# Patient Record
Sex: FEMALE | Race: OTHER | Employment: FULL TIME | URBAN - METROPOLITAN AREA
[De-identification: names, ages, dates, MRNs, and addresses within clinical notes are randomized per-mention and may not be internally consistent; named-entity substitution may affect disease eponyms.]

---

## 2022-03-03 ENCOUNTER — APPOINTMENT (OUTPATIENT)
Dept: CT IMAGING | Age: 42
End: 2022-03-03
Payer: COMMERCIAL

## 2022-03-03 ENCOUNTER — HOSPITAL ENCOUNTER (EMERGENCY)
Age: 42
Discharge: HOME | End: 2022-03-03
Attending: EMERGENCY MEDICINE
Payer: COMMERCIAL

## 2022-03-03 VITALS
HEART RATE: 62 BPM | OXYGEN SATURATION: 100 % | TEMPERATURE: 97.8 F | SYSTOLIC BLOOD PRESSURE: 106 MMHG | BODY MASS INDEX: 29.59 KG/M2 | HEIGHT: 63 IN | WEIGHT: 167 LBS | RESPIRATION RATE: 17 BRPM | DIASTOLIC BLOOD PRESSURE: 66 MMHG

## 2022-03-03 DIAGNOSIS — G43.109 MIGRAINE WITH AURA AND WITHOUT STATUS MIGRAINOSUS, NOT INTRACTABLE: Primary | ICD-10-CM

## 2022-03-03 PROCEDURE — 6360000002 HC RX W HCPCS: Performed by: PHYSICIAN ASSISTANT

## 2022-03-03 PROCEDURE — 96375 TX/PRO/DX INJ NEW DRUG ADDON: CPT

## 2022-03-03 PROCEDURE — 99283 EMERGENCY DEPT VISIT LOW MDM: CPT

## 2022-03-03 PROCEDURE — 70450 CT HEAD/BRAIN W/O DYE: CPT

## 2022-03-03 PROCEDURE — 96374 THER/PROPH/DIAG INJ IV PUSH: CPT

## 2022-03-03 PROCEDURE — 2580000003 HC RX 258: Performed by: PHYSICIAN ASSISTANT

## 2022-03-03 RX ORDER — KETOROLAC TROMETHAMINE 30 MG/ML
30 INJECTION, SOLUTION INTRAMUSCULAR; INTRAVENOUS ONCE
Status: COMPLETED | OUTPATIENT
Start: 2022-03-03 | End: 2022-03-03

## 2022-03-03 RX ORDER — ALBUTEROL SULFATE 2.5 MG/3ML
2.5 SOLUTION RESPIRATORY (INHALATION) EVERY 6 HOURS PRN
COMMUNITY

## 2022-03-03 RX ORDER — PROCHLORPERAZINE EDISYLATE 5 MG/ML
10 INJECTION INTRAMUSCULAR; INTRAVENOUS ONCE
Status: COMPLETED | OUTPATIENT
Start: 2022-03-03 | End: 2022-03-03

## 2022-03-03 RX ORDER — 0.9 % SODIUM CHLORIDE 0.9 %
1000 INTRAVENOUS SOLUTION INTRAVENOUS ONCE
Status: COMPLETED | OUTPATIENT
Start: 2022-03-03 | End: 2022-03-03

## 2022-03-03 RX ORDER — DIPHENHYDRAMINE HYDROCHLORIDE 50 MG/ML
25 INJECTION INTRAMUSCULAR; INTRAVENOUS ONCE
Status: COMPLETED | OUTPATIENT
Start: 2022-03-03 | End: 2022-03-03

## 2022-03-03 RX ADMIN — KETOROLAC TROMETHAMINE 30 MG: 30 INJECTION, SOLUTION INTRAMUSCULAR at 12:54

## 2022-03-03 RX ADMIN — PROCHLORPERAZINE EDISYLATE 10 MG: 5 INJECTION INTRAMUSCULAR; INTRAVENOUS at 12:18

## 2022-03-03 RX ADMIN — DIPHENHYDRAMINE HYDROCHLORIDE 25 MG: 50 INJECTION, SOLUTION INTRAMUSCULAR; INTRAVENOUS at 12:15

## 2022-03-03 RX ADMIN — SODIUM CHLORIDE 1000 ML: 9 INJECTION, SOLUTION INTRAVENOUS at 12:20

## 2022-03-03 ASSESSMENT — PAIN SCALES - GENERAL
PAINLEVEL_OUTOF10: 7
PAINLEVEL_OUTOF10: 3

## 2022-03-03 NOTE — ED PROVIDER NOTES
[address]  eMERGENCY dEPARTMENT eNCOUnter   Independent Attestation     Pt Name: Teddy Vargas  MRN: [id number]  [place] 1980  Date of evaluation: 3/3/22       Teddy Vargas is a 37 y.o. female who presents with Migraine        Based on the medical record, the care appears appropriate. I was personally available for consultation in the Emergency Department.     Geovanna Carcamo MD  Attending Emergency  Physician                 Geovanna Carcamo MD  03/03/22 3552

## 2022-03-03 NOTE — ED NOTES
Mode of arrival (squad #, walk in, police, etc) : Walked into triage        Chief complaint(s): Migraine Headache        Arrival Note (brief scenario, treatment PTA, etc). : Complaining of migraine h/a which started yesterday. Complaining of nausea. Hx Migraines. A/O X 3        C= \"Have you ever felt that you should Cut down on your drinking? \"  No  A= \"Have people Annoyed you by criticizing your drinking? \"  No  G= \"Have you ever felt bad or Guilty about your drinking? \"  No  E= \"Have you ever had a drink as an Eye-opener first thing in the morning to steady your nerves or to help a hangover? \"  No      Deferred []      Reason for deferring: N/A    *If yes to two or more: probable alcohol abuse. Aaliyah Schroeder RN  03/03/22 1329

## 2022-03-03 NOTE — ED PROVIDER NOTES
[address]  eMERGENCY dEPARTMENT eNCOUnter      Pt Name: Jennifer Watson  MRN: [id number]  [place] 1980  Date of evaluation: 3/3/22      CHIEF COMPLAINT:   Chief Complaint   Patient presents with    Migraine     HISTORY OF PRESENT ILLNESS    Jennifer Watson is a 37 y.o. female who presents with headache that started yesterday. Pt reports the headache has worsened since onset and was not \"thunderclap\" in origin. States this headache is different from typical migraines; therefore, worst headache of life. There has been no syncope associated either. Pt describes the pain as throbbing over forehead with radiation to back of head. She admits to photophobia and nausea. Denies syncope, fever, chills, neck stiffness, URI symptoms, cp, sob, abd pain, numbness, weakness. She did have one episode of emesis yesterday. Has tried tylenol and ASA. Reports hx of migraines. States she has not had one in 2.5 years. Pt did recently move here from [place]. No other complaints. REVIEW OF SYSTEMS     Headache  Photophobia  Nausea  Emesis    Negative in 10 essential Systems except as mentioned above and in the HPI. PAST MEDICAL HISTORY   PMH:  has a past medical history of Asthma and Headache. none otherwise stated from nurses notes  Surgical History:  has a past surgical history that includes Abdomen surgery. none otherwise stated from nurses notes  Social History:  reports that she has never smoked. She has never used smokeless tobacco. She reports that she does not drink alcohol and does not use drugs. , lives at home with others  Family History: none  Psychiatric History: none    Allergies:is allergic to pcn [penicillins]. PHYSICAL EXAM     INITIAL VITALS: /66   Pulse 62   Temp 97.8 °F (36.6 °C) (Oral)   Resp 17   Ht 5' 3\" (1.6 m)   Wt 167 lb (75.8 kg)   LMP 02/09/2022   SpO2 100%   BMI 29.58 kg/m²   Physical Exam  Vitals and nursing note reviewed.    Constitutional:       General: She is awake. Appearance: Normal appearance. She is well-developed, well-groomed and normal weight. HENT:      Head: Normocephalic and atraumatic. Eyes:      Extraocular Movements: Extraocular movements intact. Conjunctiva/sclera: Conjunctivae normal.      Pupils: Pupils are equal, round, and reactive to light. Cardiovascular:      Rate and Rhythm: Normal rate and regular rhythm. Pulses: Normal pulses. Heart sounds: Normal heart sounds, S1 normal and S2 normal.   Pulmonary:      Effort: Pulmonary effort is normal.      Breath sounds: Normal breath sounds and air entry. Abdominal:      Tenderness: There is no abdominal tenderness. There is no guarding. Musculoskeletal:         General: Normal range of motion. Cervical back: Full passive range of motion without pain and normal range of motion. No rigidity or tenderness. No spinous process tenderness or muscular tenderness. Lymphadenopathy:      Cervical: No cervical adenopathy. Skin:     General: Skin is warm. Capillary Refill: Capillary refill takes less than 2 seconds. Neurological:      General: No focal deficit present. Mental Status: She is alert and oriented to person, place, and time. Mental status is at baseline. Cranial Nerves: Cranial nerves are intact. No cranial nerve deficit, dysarthria or facial asymmetry. Sensory: Sensation is intact. Motor: Motor function is intact. No weakness, tremor, atrophy, abnormal muscle tone, seizure activity or pronator drift. Coordination: Coordination is intact. Romberg sign negative. Coordination normal. Finger-Nose-Finger Test normal. Rapid alternating movements normal.      Gait: Gait is intact. Psychiatric:         Behavior: Behavior is cooperative.            EMERGENCY DEPARTMENT COURSE:     Orders Placed This Encounter   Medications    0.9 % sodium chloride bolus    prochlorperazine (COMPAZINE) injection 10 mg    diphenhydrAMINE (BENADRYL) injection 25 mg    ketorolac (TORADOL) injection 30 mg       Migraine that began yesterday. Worsened today. Associated photophobia and nausea. Hx of migraines. Has not had one in several years. On exam, there are no neurological deficits. VSS. Suspect migraine. Will provide migraine cocktail. Will get ct head due to not having migraine for several years. Pt reports pain is down to a 7. Will add toradol. Ct head is unremarkable. Pt states pain is down to a 2 after toradol. Feeling much better. Pt is ok for dc home. Low concern for ICH, stroke, infection, temporal arteritis, or other serious etiology. Discussed with dr. Trini Benoit who is agreeable. Discussed results and plan with the pt. They expressed appropriate understanding. Pt given close follow up, supportive care instructions and strict return instructions at the bedside. The patient presents with headache without signs of CNS bleed, stroke, infection, temporal arteritis, idiopathic intracranial hypertension, or other serious etiology. The patient is neurologically intact. Given the extremely low risk of these diagnoses further testing and evaluation for these possibilities does not appear to be indicated at this time. The patient has been instructed to return if the symptoms worsen or change in any way. The patient verbalized understanding. The care is provided during an unprecedented national emergency due to the novel coronavirus, COVID-19. FINAL IMPRESSION:     1.  Migraine with aura and without status migrainosus, not intractable          DISPOSITION:  DISPOSITION Decision To Discharge 03/03/2022 02:15:36 PM        PATIENT REFERRED TO:  [facility]  [address]  [address] [facility] [id number] [phone]  Call       [facility] ED  [facility] [address]  [phone]          DISCHARGE MEDICATIONS:  New Prescriptions    No medications on file       (Please note that portions of this note were completed with a voice recognition program.  Efforts were made to edit the dictations but occasionally words are mis-transcribed.)       Dimitry Parham PA-C  03/03/22 8214

## 2022-03-31 ENCOUNTER — OFFICE VISIT (OUTPATIENT)
Dept: FAMILY MEDICINE CLINIC | Age: 42
End: 2022-03-31
Payer: COMMERCIAL

## 2022-03-31 VITALS
DIASTOLIC BLOOD PRESSURE: 76 MMHG | HEART RATE: 55 BPM | TEMPERATURE: 97.2 F | SYSTOLIC BLOOD PRESSURE: 112 MMHG | OXYGEN SATURATION: 100 %

## 2022-03-31 DIAGNOSIS — J02.9 SORE THROAT: ICD-10-CM

## 2022-03-31 DIAGNOSIS — J30.2 SEASONAL ALLERGIC RHINITIS, UNSPECIFIED TRIGGER: Primary | ICD-10-CM

## 2022-03-31 LAB — S PYO AG THROAT QL: NORMAL

## 2022-03-31 PROCEDURE — 87880 STREP A ASSAY W/OPTIC: CPT | Performed by: NURSE PRACTITIONER

## 2022-03-31 PROCEDURE — 96372 THER/PROPH/DIAG INJ SC/IM: CPT | Performed by: NURSE PRACTITIONER

## 2022-03-31 PROCEDURE — 99213 OFFICE O/P EST LOW 20 MIN: CPT | Performed by: NURSE PRACTITIONER

## 2022-03-31 RX ORDER — CETIRIZINE HYDROCHLORIDE 10 MG/1
10 TABLET ORAL DAILY
COMMUNITY

## 2022-03-31 RX ORDER — CALCIUM CARBONATE 500(1250)
500 TABLET ORAL DAILY
COMMUNITY

## 2022-03-31 RX ORDER — ERGOCALCIFEROL (VITAMIN D2) 1250 MCG
50000 CAPSULE ORAL WEEKLY
COMMUNITY

## 2022-03-31 RX ORDER — POLYETHYLENE GLYCOL 3350 17 G/17G
17 POWDER, FOR SOLUTION ORAL DAILY
COMMUNITY

## 2022-03-31 RX ORDER — TRIAMCINOLONE ACETONIDE 40 MG/ML
40 INJECTION, SUSPENSION INTRA-ARTICULAR; INTRAMUSCULAR ONCE
Status: COMPLETED | OUTPATIENT
Start: 2022-03-31 | End: 2022-03-31

## 2022-03-31 RX ORDER — BIOTIN 1 MG
1000 TABLET ORAL 3 TIMES DAILY
COMMUNITY

## 2022-03-31 RX ORDER — HYDROXYCHLOROQUINE SULFATE 200 MG/1
400 TABLET, FILM COATED ORAL DAILY
COMMUNITY
End: 2022-07-14 | Stop reason: SDUPTHER

## 2022-03-31 RX ADMIN — TRIAMCINOLONE ACETONIDE 40 MG: 40 INJECTION, SUSPENSION INTRA-ARTICULAR; INTRAMUSCULAR at 15:30

## 2022-03-31 ASSESSMENT — ENCOUNTER SYMPTOMS
SWOLLEN GLANDS: 0
ABDOMINAL PAIN: 0
SINUS PAIN: 0
DIARRHEA: 0
SORE THROAT: 1
COUGH: 1
RHINORRHEA: 1
NAUSEA: 0
WHEEZING: 1
VOMITING: 0

## 2022-03-31 ASSESSMENT — PATIENT HEALTH QUESTIONNAIRE - PHQ9
SUM OF ALL RESPONSES TO PHQ QUESTIONS 1-9: 0
2. FEELING DOWN, DEPRESSED OR HOPELESS: 0
1. LITTLE INTEREST OR PLEASURE IN DOING THINGS: 0
SUM OF ALL RESPONSES TO PHQ QUESTIONS 1-9: 0
SUM OF ALL RESPONSES TO PHQ QUESTIONS 1-9: 0
SUM OF ALL RESPONSES TO PHQ9 QUESTIONS 1 & 2: 0
SUM OF ALL RESPONSES TO PHQ QUESTIONS 1-9: 0

## 2022-03-31 NOTE — PROGRESS NOTES
[address] [address]  Dept: [phone]  Dept Fax: [phone]    Rosamaria Morin is a 37 y.o. female who presents to the urgent care today for her medical conditions/complaints as notedbelow. Rosamaria Morin is c/o of Nasal Congestion (onset yesterday) and Other (itchy throat and starting to have issues wheezing)      HPI:     37 yr old female with hx lupus presents for allergy flare - c/o Nasal Congestion - runny nose (onset yesterday) and Other (itchy throat and starting to have issues wheezing)  Sore throat worse than her usual  Hx asthma, has been using inhaler, does not need refills  Covid Vaccinated? 3 doses  Declines covid testing  Would like steroid shot for allergy mngmt - lives [place] - here for work at [employer]. PCP gives her Kenalog shot every year. URI   This is a new problem. The current episode started yesterday. The problem has been gradually worsening. There has been no fever. Associated symptoms include congestion, coughing, headaches, rhinorrhea, a sore throat and wheezing. Pertinent negatives include no abdominal pain, chest pain, diarrhea, dysuria, ear pain, joint pain, joint swelling, nausea, neck pain, plugged ear sensation, rash, sinus pain, sneezing, swollen glands or vomiting. She has tried nothing for the symptoms. The treatment provided no relief.        Past Medical History:   Diagnosis Date    Asthma     Headache         Current Outpatient Medications   Medication Sig Dispense Refill    Biotin 1000 MCG TABS Take 1,000 mcg by mouth 3 times daily      cetirizine (ZYRTEC) 10 MG tablet Take 10 mg by mouth daily      ergocalciferol (ERGOCALCIFEROL) 1.25 MG (87912 UT) capsule Take 50,000 Units by mouth once a week      hydroxychloroquine (PLAQUENIL) 200 MG tablet Take 400 mg by mouth daily      polyethylene glycol (GLYCOLAX) 17 GM/SCOOP powder Take 17 g by mouth daily      Pediatric Multiple Vit-C-FA ([facility] GUM[facility]-3 DHA PO) Take by mouth      calcium carbonate (OSCAL) 500 MG TABS tablet Take 500 mg by mouth daily      albuterol (PROVENTIL) (2.5 MG/3ML) 0.083% nebulizer solution Take 2.5 mg by nebulization every 6 hours as needed for Wheezing       No current facility-administered medications for this visit. Allergies   Allergen Reactions    Penicillins Anaphylaxis, Hives, Rash and Shortness Of Breath     Happened when she was 8mo old       Subjective:      Review of Systems   HENT: Positive for congestion, rhinorrhea and sore throat. Negative for ear pain, sinus pain and sneezing. Respiratory: Positive for cough and wheezing. Cardiovascular: Negative for chest pain. Gastrointestinal: Negative for abdominal pain, diarrhea, nausea and vomiting. Genitourinary: Negative for dysuria. Musculoskeletal: Negative for joint pain and neck pain. Skin: Negative for rash. Neurological: Positive for headaches. All other systems reviewed and are negative. 14 systems reviewed and negative except as listed in HPI. Objective:     Physical Exam  Vitals and nursing note reviewed. Constitutional:       General: She is not in acute distress. Appearance: Normal appearance. She is well-developed. She is not ill-appearing, toxic-appearing or diaphoretic. Comments: nontoxic   HENT:      Head: Normocephalic and atraumatic. Right Ear: Tympanic membrane, ear canal and external ear normal.      Left Ear: Tympanic membrane, ear canal and external ear normal.      Nose: Rhinorrhea present. No congestion. Mouth/Throat:      Mouth: Mucous membranes are moist.      Pharynx: Posterior oropharyngeal erythema present. No oropharyngeal exudate. Comments: Swallows without difficulty  No tongue elevation  No oropharyngeal swelling    Eyes:      General: No scleral icterus. Right eye: No discharge. Left eye: No discharge. Extraocular Movements: Extraocular movements intact. Conjunctiva/sclera: Conjunctivae normal.      Pupils: Pupils are equal, round, and reactive to light. Cardiovascular:      Rate and Rhythm: Normal rate and regular rhythm. Pulses: Normal pulses. Heart sounds: Normal heart sounds. Pulmonary:      Effort: Pulmonary effort is normal. No respiratory distress. Breath sounds: Normal breath sounds. No stridor. No wheezing, rhonchi or rales. Chest:      Chest wall: No tenderness. Abdominal:      General: Bowel sounds are normal. There is no distension. Palpations: Abdomen is soft. Tenderness: There is no abdominal tenderness. Musculoskeletal:         General: No tenderness or deformity. Normal range of motion. Cervical back: Normal range of motion and neck supple. Lymphadenopathy:      Cervical: No cervical adenopathy. Skin:     General: Skin is warm and dry. Capillary Refill: Capillary refill takes less than 2 seconds. Findings: No rash ( no rash to visible skin). Neurological:      General: No focal deficit present. Mental Status: She is alert and oriented to person, place, and time. Motor: No abnormal muscle tone. Coordination: Coordination normal.   Psychiatric:         Mood and Affect: Mood normal.         Behavior: Behavior normal.       /76 (Site: Left Upper Arm, Position: Sitting, Cuff Size: Medium Adult)   Pulse 55   Temp 97.2 °F (36.2 °C) (Infrared)   SpO2 100%     Assessment:       Diagnosis Orders   1. Sore throat  POCT rapid strep A   2.  Seasonal allergic rhinitis, unspecified trigger         Plan:      Results for POC orders placed in visit on 03/31/22   POCT rapid strep A   Result Value Ref Range    Strep A Ag None Detected None Detected     POCT strep neg  I believe her st is from the P[facility]  Here for allergy shot - from [place], here for work for next few months  Kenalog IM  Start home flonase - works well for her  Return if symptoms worsen or fail to improve, for Make an Appt. with your Primary Care in 1 week. Orders Placed This Encounter   Medications    triamcinolone acetonide (KENALOG-40) injection 40 mg         Patient given educational materials - see patient instructions. Discussed use, benefit, and side effects of prescribed medications. All patient questions answered. Pt voicedunderstanding.     Electronically signed by NATHALY Fry CNP on 3/31/2022 at 3:35 PM

## 2022-03-31 NOTE — PATIENT INSTRUCTIONS
Patient Education        Seasonal Allergies: Care Instructions  Your Care Instructions     Allergies occur when your body's defense system (immune system) overreacts to certain substances. The immune system treats a harmless substance as if it were a harmful germ or virus. Many things can cause this to happen. Examples includepollens, medicine, food, dust, animal dander, and mold. Your allergies are seasonal if you have symptoms just at certain times of the year. In that case, you are probably allergic to pollens from certain trees,grasses, or weeds. Allergies can be mild or severe. Over-the-counter allergy medicine may helpwith some symptoms. Read and follow all instructions on the label. Managing your allergies is an important part of staying healthy. Your doctor may suggest that you have tests to help find the cause of your allergies. When you know what things trigger your symptoms, you can avoid them. This canprevent allergy symptoms and other health problems. In some cases, immunotherapy might help. For this treatment, you get shots or use pills that have a small amount of certain allergens in them. Your body \"gets used to\" the allergen, so you react less to it over time. This kind oftreatment may help prevent or reduce some allergy symptoms. Follow-up care is a key part of your treatment and safety. Be sure to make and go to all appointments, and call your doctor if you are having problems. It's also a good idea to know your test results and keep alist of the medicines you take. How can you care for yourself at home?  Be safe with medicines. Take your medicines exactly as prescribed. Call your doctor if you think you are having a problem with your medicine.  During your allergy season, keep windows closed. If you need to use air-conditioning, change or clean all filters every month. Take a shower and change your clothes after you have been outside.  Stay inside when pollen counts are high.  Vacuum once or twice a week. Use a vacuum  with a HEPA filter or a double-thickness filter. When should you call for help? Give an epinephrine shot if:     You think you are having a severe allergic reaction. After giving an epinephrine shot, call 911, even if you feel better. Call 911 if:     You have symptoms of a severe allergic reaction. These may include:  ? Sudden raised, red areas (hives) all over your body. ? Swelling of the throat, mouth, lips, or tongue. ? Trouble breathing. ? Passing out (losing consciousness). Or you may feel very lightheaded or suddenly feel weak, confused, or restless.      You have been given an epinephrine shot, even if you feel better. Call your doctor now or seek immediate medical care if:     You have symptoms of an allergic reaction, such as:  ? A rash or hives (raised, red areas on the skin). ? Itching. ? Swelling. ? Belly pain, nausea, or vomiting. Watch closely for changes in your health, and be sure to contact your doctor if:     You do not get better as expected. Where can you learn more? Go to https://[email or web address]. org and sign in to your [employer] account. Enter J912 in the [facility] box to learn more about \"Seasonal Allergies: Care Instructions. \"     If you do not have an account, please click on the \"Sign Up Now\" link. Current as of: February 10, 2021               Content Version: 13.2  © 3646-5974 Healthwise, [facility]. Care instructions adapted under license by [facility] ([facility]). If you have questions about a medical condition or this instruction, always ask your healthcare professional. [place] [address] any warranty or liability for your use of this information.

## 2022-04-27 ENCOUNTER — OFFICE VISIT (OUTPATIENT)
Dept: FAMILY MEDICINE CLINIC | Age: 42
End: 2022-04-27
Payer: COMMERCIAL

## 2022-04-27 VITALS
SYSTOLIC BLOOD PRESSURE: 102 MMHG | OXYGEN SATURATION: 99 % | DIASTOLIC BLOOD PRESSURE: 68 MMHG | BODY MASS INDEX: 28.53 KG/M2 | WEIGHT: 161 LBS | RESPIRATION RATE: 20 BRPM | HEART RATE: 60 BPM | HEIGHT: 63 IN

## 2022-04-27 DIAGNOSIS — G43.701 CHRONIC MIGRAINE WITHOUT AURA WITH STATUS MIGRAINOSUS, NOT INTRACTABLE: ICD-10-CM

## 2022-04-27 DIAGNOSIS — Z13.29 SCREENING FOR THYROID DISORDER: ICD-10-CM

## 2022-04-27 DIAGNOSIS — Z76.89 ENCOUNTER TO ESTABLISH CARE: Primary | ICD-10-CM

## 2022-04-27 DIAGNOSIS — G47.26 SLEEP DISORDER, SHIFT WORK: ICD-10-CM

## 2022-04-27 DIAGNOSIS — D68.61 ANTIPHOSPHOLIPID SYNDROME (HCC): ICD-10-CM

## 2022-04-27 DIAGNOSIS — K59.00 CONSTIPATION, UNSPECIFIED CONSTIPATION TYPE: ICD-10-CM

## 2022-04-27 PROCEDURE — 99203 OFFICE O/P NEW LOW 30 MIN: CPT | Performed by: NURSE PRACTITIONER

## 2022-04-27 SDOH — ECONOMIC STABILITY: FOOD INSECURITY: WITHIN THE PAST 12 MONTHS, YOU WORRIED THAT YOUR FOOD WOULD RUN OUT BEFORE YOU GOT MONEY TO BUY MORE.: NEVER TRUE

## 2022-04-27 SDOH — ECONOMIC STABILITY: FOOD INSECURITY: WITHIN THE PAST 12 MONTHS, THE FOOD YOU BOUGHT JUST DIDN'T LAST AND YOU DIDN'T HAVE MONEY TO GET MORE.: NEVER TRUE

## 2022-04-27 ASSESSMENT — ENCOUNTER SYMPTOMS
EYE PAIN: 0
ABDOMINAL PAIN: 0
SINUS PRESSURE: 0
SHORTNESS OF BREATH: 0
NAUSEA: 0
CONSTIPATION: 1
COLOR CHANGE: 0
CHEST TIGHTNESS: 0
VOMITING: 0
DIARRHEA: 0
SINUS PAIN: 0
BACK PAIN: 0

## 2022-04-27 ASSESSMENT — SOCIAL DETERMINANTS OF HEALTH (SDOH): HOW HARD IS IT FOR YOU TO PAY FOR THE VERY BASICS LIKE FOOD, HOUSING, MEDICAL CARE, AND HEATING?: NOT HARD AT ALL

## 2022-04-27 NOTE — PROGRESS NOTES
not tried anything to help herself get to sleep  Was taking xyzol at night because it put her to sleep but wakes up feeling groggy and out of sorts  Utterly exhausted, has tried asking to change work schedule but keeps getting told it is not possible   Does not really want to take anything that will make her feel worse after waking up    HA: Went to ER beginning of March for thunderclap migraine  Per HPI: Armani Phillips is a 37 y.o. female who presents with headache that started yesterday. Pt reports the headache has worsened since onset and was not \"thunderclap\" in origin. States this headache is different from typical migraines; therefore, worst headache of life. There has been no syncope associated either. Pt describes the pain as throbbing over forehead with radiation to back of head. She admits to photophobia and nausea. Denies syncope, fever, chills, neck stiffness, URI symptoms, cp, sob, abd pain, numbness, weakness. She did have one episode of emesis yesterday. Has tried tylenol and ASA. Reports hx of migraines. States she has not had one in 2.5 years. Pt did recently move here from [place]. No other complaints. CT head was negative  Given migraine cocktail with some improvement  Since ER visit has had dull frontal headache, is attributing this to lack of sleep and exhaustion. Does not drink water like she should. Has one cup of coffee in AM, followed by red bull. Established with rheumatology in [address] [place] [address] 153; has a lot of blood work recently ordered. Will be faxed here for completion. Antiphospholipid syndrome    Takes weekly vitamin D supplement     GI: Started taking fiber gummies, this has helped regulate her BMs  She now goes 3 days a week when before it was only once  Hx of bariatric surgery, last saw bariatrics before she left [place]    Denies any other problems/concerns at this time       Review of Systems   Constitutional: Positive for fatigue.  Negative for activity change, chills and unexpected weight change. HENT: Negative for hearing loss, postnasal drip, sinus pressure and sinus pain. Eyes: Negative for pain and visual disturbance. Respiratory: Negative for chest tightness and shortness of breath. Cardiovascular: Negative for chest pain and palpitations. Gastrointestinal: Positive for constipation. Negative for abdominal pain, diarrhea, nausea and vomiting. Endocrine: Negative for polydipsia, polyphagia and polyuria. Genitourinary: Negative for dysuria, flank pain, frequency and urgency. Musculoskeletal: Negative for arthralgias, back pain and myalgias. Skin: Negative for color change and rash. Neurological: Positive for headaches. Negative for dizziness, weakness, light-headedness and numbness. Psychiatric/Behavioral: Positive for sleep disturbance. Negative for confusion, hallucinations, self-injury and suicidal ideas. The patient is nervous/anxious. Objective   Physical Exam  Vitals and nursing note reviewed. Constitutional:       Appearance: Normal appearance. HENT:      Head: Normocephalic. Right Ear: Hearing, tympanic membrane, ear canal and external ear normal.      Left Ear: Hearing, tympanic membrane, ear canal and external ear normal.      Nose: Nose normal.      Mouth/Throat:      Mouth: Mucous membranes are moist.      Pharynx: Oropharynx is clear. Eyes:      Extraocular Movements: Extraocular movements intact. Conjunctiva/sclera: Conjunctivae normal.      Pupils: Pupils are equal, round, and reactive to light. Cardiovascular:      Rate and Rhythm: Normal rate and regular rhythm. Pulses: Normal pulses. Heart sounds: Normal heart sounds, S1 normal and S2 normal.   Pulmonary:      Effort: Pulmonary effort is normal.      Breath sounds: Normal breath sounds and air entry. Abdominal:      General: Abdomen is flat. Bowel sounds are normal.      Palpations: Abdomen is soft.    Musculoskeletal:         General: Normal range of motion. Cervical back: Normal range of motion. Skin:     General: Skin is warm and dry. Capillary Refill: Capillary refill takes less than 2 seconds. Neurological:      General: No focal deficit present. Mental Status: She is alert and oriented to person, place, and time. Mental status is at baseline. Psychiatric:         Attention and Perception: Attention and perception normal.         Mood and Affect: Mood and affect normal.         Speech: Speech normal.         Behavior: Behavior normal. Behavior is cooperative. Thought Content: Thought content normal.         Cognition and Memory: Cognition and memory normal.         Judgment: Judgment normal.      Comments: Appears exhausted during exam.               Wt Readings from Last 3 Encounters:   04/27/22 161 lb (73 kg)   03/03/22 167 lb (75.8 kg)     Temp Readings from Last 3 Encounters:   03/31/22 97.2 °F (36.2 °C) (Infrared)   03/03/22 97.8 °F (36.6 °C) (Oral)     BP Readings from Last 3 Encounters:   04/27/22 102/68   03/31/22 112/76   03/03/22 106/66     Pulse Readings from Last 3 Encounters:   04/27/22 60   03/31/22 55   03/03/22 62           An electronic signature was used to authenticate this note.     --NATHALY Navarrete NP

## 2022-04-27 NOTE — ASSESSMENT & PLAN NOTE
Borderline controlled, lifestyle modifications recommended and discussed increasing water intake. Try to maintain consistent sleep schedule when not at work. Avoid drinking energy drinks.  Begin headache journal.

## 2022-05-11 ENCOUNTER — HOSPITAL ENCOUNTER (OUTPATIENT)
Age: 42
Setting detail: SPECIMEN
Discharge: HOME | End: 2022-05-11

## 2022-05-11 ENCOUNTER — OFFICE VISIT (OUTPATIENT)
Dept: FAMILY MEDICINE CLINIC | Age: 42
End: 2022-05-11
Payer: COMMERCIAL

## 2022-05-11 VITALS
OXYGEN SATURATION: 99 % | HEART RATE: 58 BPM | RESPIRATION RATE: 20 BRPM | WEIGHT: 161.2 LBS | HEIGHT: 63 IN | BODY MASS INDEX: 28.56 KG/M2 | SYSTOLIC BLOOD PRESSURE: 108 MMHG | DIASTOLIC BLOOD PRESSURE: 68 MMHG

## 2022-05-11 DIAGNOSIS — R53.83 FATIGUE, UNSPECIFIED TYPE: Primary | ICD-10-CM

## 2022-05-11 DIAGNOSIS — Z13.29 SCREENING FOR THYROID DISORDER: ICD-10-CM

## 2022-05-11 DIAGNOSIS — M32.9 LUPUS (HCC): ICD-10-CM

## 2022-05-11 PROBLEM — R06.02 SOB (SHORTNESS OF BREATH): Status: ACTIVE | Noted: 2019-11-24

## 2022-05-11 PROBLEM — E78.49 OTHER HYPERLIPIDEMIA: Status: ACTIVE | Noted: 2021-08-04

## 2022-05-11 PROBLEM — R76.8 ANA POSITIVE: Status: ACTIVE | Noted: 2020-03-24

## 2022-05-11 PROBLEM — J45.41 MODERATE PERSISTENT ASTHMA WITH ACUTE EXACERBATION: Status: ACTIVE | Noted: 2020-03-24

## 2022-05-11 PROBLEM — R53.82 CHRONIC FATIGUE: Status: ACTIVE | Noted: 2020-03-24

## 2022-05-11 PROBLEM — M47.22 CERVICAL SPONDYLOSIS WITH RADICULOPATHY: Status: ACTIVE | Noted: 2018-11-19

## 2022-05-11 PROBLEM — M50.20 CERVICAL HERNIATED DISC: Status: ACTIVE | Noted: 2018-11-19

## 2022-05-11 PROBLEM — N94.10 DYSPAREUNIA IN FEMALE: Status: ACTIVE | Noted: 2021-12-10

## 2022-05-11 PROBLEM — K21.9 GASTROESOPHAGEAL REFLUX DISEASE: Status: ACTIVE | Noted: 2021-04-07

## 2022-05-11 PROBLEM — M79.641 PAIN IN RIGHT HAND: Status: ACTIVE | Noted: 2020-03-24

## 2022-05-11 PROBLEM — J01.10 ACUTE NON-RECURRENT FRONTAL SINUSITIS: Status: ACTIVE | Noted: 2020-03-24

## 2022-05-11 PROBLEM — M77.12 LEFT TENNIS ELBOW: Status: ACTIVE | Noted: 2020-03-24

## 2022-05-11 PROBLEM — N89.8 VAGINAL DRYNESS: Status: ACTIVE | Noted: 2021-12-10

## 2022-05-11 PROBLEM — D68.61 ANTIPHOSPHOLIPID SYNDROME (HCC): Status: ACTIVE | Noted: 2020-03-24

## 2022-05-11 LAB
ABSOLUTE EOS #: 0.16 K/UL (ref 0–0.44)
ABSOLUTE IMMATURE GRANULOCYTE: <0.03 K/UL (ref 0–0.3)
ABSOLUTE LYMPH #: 1.66 K/UL (ref 1.1–3.7)
ABSOLUTE MONO #: 0.42 K/UL (ref 0.1–1.2)
BASOPHILS # BLD: 1 % (ref 0–2)
BASOPHILS ABSOLUTE: 0.06 K/UL (ref 0–0.2)
EOSINOPHILS RELATIVE PERCENT: 2 % (ref 1–4)
HCT VFR BLD CALC: 41.9 % (ref 36.3–47.1)
HEMOGLOBIN: 12.8 G/DL (ref 11.9–15.1)
IMMATURE GRANULOCYTES: 0 %
LYMPHOCYTES # BLD: 22 % (ref 24–43)
MCH RBC QN AUTO: 29.8 PG (ref 25.2–33.5)
MCHC RBC AUTO-ENTMCNC: 30.5 G/DL (ref 28.4–34.8)
MCV RBC AUTO: 97.7 FL (ref 82.6–102.9)
MONOCYTES # BLD: 6 % (ref 3–12)
NRBC AUTOMATED: 0 PER 100 WBC
PDW BLD-RTO: 13 % (ref 11.8–14.4)
PLATELET # BLD: 263 K/UL (ref 138–453)
PMV BLD AUTO: 10.7 FL (ref 8.1–13.5)
RBC # BLD: 4.29 M/UL (ref 3.95–5.11)
SEDIMENTATION RATE, ERYTHROCYTE: 11 MM/HR (ref 0–20)
SEG NEUTROPHILS: 69 % (ref 36–65)
SEGMENTED NEUTROPHILS ABSOLUTE COUNT: 5.15 K/UL (ref 1.5–8.1)
TSH SERPL DL<=0.05 MIU/L-ACNC: 0.9 UIU/ML (ref 0.3–5)
WBC # BLD: 7.5 K/UL (ref 3.5–11.3)

## 2022-05-11 PROCEDURE — 99213 OFFICE O/P EST LOW 20 MIN: CPT | Performed by: NURSE PRACTITIONER

## 2022-05-11 RX ORDER — METHYLPREDNISOLONE 4 MG/1
TABLET ORAL
Qty: 1 KIT | Refills: 0 | Status: SHIPPED | OUTPATIENT
Start: 2022-05-11 | End: 2022-05-17

## 2022-05-11 NOTE — PROGRESS NOTES
Elizabeth Rebolledo (:  1980) is a 37 y.o. female,Established patient, here for evaluation of the following chief complaint(s):  Fatigue (follow up-states getting worse. Pt states she falls asleep in the truck during work)         ASSESSMENT/PLAN:  1. Fatigue, unspecified type  Comments:  Discussed that she will need to talk to boss about work schdule. Fatigue likely a result of not obtaining enough rest between shifts and frequent swing shifts. 2. Lupus ([facility] [address]  Comments:  Complete labs as ordered by rheumatology. Rx for medrol jaylon for lupus flare. Orders:  -     methylPREDNISolone (MEDROL, JAYLON,) 4 MG tablet; Take as directed, Disp-1 kit, R-0Normal      Return for As scheduled 10/24/22. Subjective   SUBJECTIVE/OBJECTIVE:  Presents for acute visit to discuss on going fatigue     Right side has been swelling a little bit, has noticed it in her right hand and right leg - this is typical of her lupus flares. She did call rheumatologist in [place] and spoke with NP in office but it did not sound like she was going to send anything in for her. States her rheumatologist would usually call in medrol dose jaylon with no problems. Fatigue: Getting worse. After lunch when 1pm hits, she can not even keep her eyes open. Trying to go to bed earlier when she can 8:45pm-9:45, wakes up at 4:30am.  Works until 7-7:30pm on day shift days. Two days later goes in at noon and gets off at midnight. Gets one day off a month, she tries to catch up on house work on that one day. Employer is trying to get her to do a 19 day on 2 day off schedule. She can not fathom doing this. She is exhausted and ready to go home to [place]. Denies any other problems/concerns at this time         Review of Systems   Constitutional: Positive for activity change and fatigue. Negative for chills and unexpected weight change. HENT: Negative for hearing loss, postnasal drip, sinus pressure and sinus pain.     Eyes: Negative for pain and visual disturbance. Respiratory: Negative for chest tightness and shortness of breath. Cardiovascular: Negative for chest pain and palpitations. Gastrointestinal: Negative for abdominal pain, constipation, diarrhea, nausea and vomiting. Endocrine: Negative for polydipsia, polyphagia and polyuria. Genitourinary: Negative for dysuria, flank pain, frequency and urgency. Musculoskeletal: Positive for arthralgias and myalgias. Negative for back pain. Skin: Negative for color change and rash. Neurological: Negative for dizziness, weakness, light-headedness, numbness and headaches. Psychiatric/Behavioral: Positive for decreased concentration and sleep disturbance. Negative for confusion, hallucinations, self-injury and suicidal ideas. The patient is not nervous/anxious. Objective   Physical Exam  Vitals and nursing note reviewed. Constitutional:       Appearance: Normal appearance. Comments: Appears exhausted, periorbital hyperpigmentation noted to bilateral eyes    HENT:      Head: Normocephalic. Right Ear: Tympanic membrane, ear canal and external ear normal.      Left Ear: Tympanic membrane, ear canal and external ear normal.      Nose: Nose normal.      Mouth/Throat:      Mouth: Mucous membranes are moist.      Pharynx: Oropharynx is clear. Eyes:      Extraocular Movements: Extraocular movements intact. Conjunctiva/sclera: Conjunctivae normal.      Pupils: Pupils are equal, round, and reactive to light. Cardiovascular:      Rate and Rhythm: Normal rate and regular rhythm. Pulses: Normal pulses. Heart sounds: Normal heart sounds, S1 normal and S2 normal.   Pulmonary:      Effort: Pulmonary effort is normal.      Breath sounds: Normal breath sounds and air entry. Abdominal:      General: Abdomen is flat. Bowel sounds are normal.      Palpations: Abdomen is soft. Musculoskeletal:         General: Normal range of motion. Cervical back: Normal range of motion. Skin:     General: Skin is warm and dry. Capillary Refill: Capillary refill takes less than 2 seconds. Neurological:      General: No focal deficit present. Mental Status: She is alert and oriented to person, place, and time. Mental status is at baseline. Psychiatric:         Attention and Perception: Attention and perception normal.         Mood and Affect: Mood normal. Affect is tearful (Is tired of being tired). Speech: Speech normal.         Behavior: Behavior normal. Behavior is cooperative. Thought Content: Thought content normal.         Cognition and Memory: Cognition and memory normal.         Judgment: Judgment normal.            Wt Readings from Last 3 Encounters:   05/11/22 161 lb 3.2 oz (73.1 kg)   04/27/22 161 lb (73 kg)   03/03/22 167 lb (75.8 kg)     Temp Readings from Last 3 Encounters:   03/31/22 97.2 °F (36.2 °C) (Infrared)   03/03/22 97.8 °F (36.6 °C) (Oral)     BP Readings from Last 3 Encounters:   05/11/22 108/68   04/27/22 102/68   03/31/22 112/76     Pulse Readings from Last 3 Encounters:   05/11/22 58   04/27/22 60   03/31/22 55            An electronic signature was used to authenticate this note.     --NATHALY Goldsmith NP

## 2022-05-12 LAB
ALBUMIN SERPL-MCNC: 4.2 G/DL (ref 3.5–5.2)
ALBUMIN/GLOBULIN RATIO: 1.5 (ref 1–2.5)
ALP BLD-CCNC: 58 U/L (ref 35–104)
ALT SERPL-CCNC: 36 U/L (ref 5–33)
ANION GAP SERPL CALCULATED.3IONS-SCNC: 19 MMOL/L (ref 9–17)
AST SERPL-CCNC: 25 U/L
BILIRUB SERPL-MCNC: 0.42 MG/DL (ref 0.3–1.2)
BUN BLDV-MCNC: 17 MG/DL (ref 6–20)
C-REACTIVE PROTEIN: <3 MG/L (ref 0–5)
CALCIUM SERPL-MCNC: 9.4 MG/DL (ref 8.6–10.4)
CHLORIDE BLD-SCNC: 107 MMOL/L (ref 98–107)
CO2: 17 MMOL/L (ref 20–31)
CREAT SERPL-MCNC: 0.67 MG/DL (ref 0.5–0.9)
GFR AFRICAN AMERICAN: >60 ML/MIN
GFR NON-AFRICAN AMERICAN: >60 ML/MIN
GFR SERPL CREATININE-BSD FRML MDRD: ABNORMAL ML/MIN/{1.73_M2}
GLUCOSE BLD-MCNC: 63 MG/DL (ref 70–99)
POTASSIUM SERPL-SCNC: 4.3 MMOL/L (ref 3.7–5.3)
SODIUM BLD-SCNC: 143 MMOL/L (ref 135–144)
TOTAL PROTEIN: 7 G/DL (ref 6.4–8.3)

## 2022-05-12 ASSESSMENT — ENCOUNTER SYMPTOMS
SINUS PRESSURE: 0
CONSTIPATION: 0
SHORTNESS OF BREATH: 0
VOMITING: 0
NAUSEA: 0
DIARRHEA: 0
COLOR CHANGE: 0
BACK PAIN: 0
SINUS PAIN: 0
EYE PAIN: 0
ABDOMINAL PAIN: 0
CHEST TIGHTNESS: 0

## 2022-05-16 LAB
ANTICARDIOLIPIN IGA ANTIBODY: 5 APL (ref 0–14)
ANTICARDIOLIPIN IGG ANTIBODY: <0.5 GPL (ref 0–10)
BETA 2 GLYCOPROT.1 IGA AB: 4.4 ELISA U/ML (ref 0–7)
BETA 2 GLYCOPROT.1 IGG AB: <0.6 ELISA U/ML (ref 0–7)
BETA 2 GLYCOPROT.1 IGM AB: <0.9 ELISA U/ML (ref 0–7)
CARDIOLIPIN AB IGM: 10 MPL (ref 0–10)

## 2022-05-25 ENCOUNTER — TELEPHONE (OUTPATIENT)
Dept: FAMILY MEDICINE CLINIC | Age: 42
End: 2022-05-25

## 2022-05-25 NOTE — TELEPHONE ENCOUNTER
Pt is calling in regards to lab work that was ordered from her rheum. From [place]. Pt states she had the labs completed her (in epic) and she thought PCP was going to look them over.

## 2022-05-25 NOTE — TELEPHONE ENCOUNTER
Patient informed of results, verbally understood. States she is not sure if her rheumatologist reviewed the labs. States they are in [place] and she has not spoke with them.

## 2022-05-25 NOTE — TELEPHONE ENCOUNTER
Thank you for letting me know - It does not go directly into my in basket for me to look at . Labs look good   Glucose was low - remembering to eat small frequent high protein meals is going to help with this. Did her rheumatologist have anything to add after reviewing results?

## 2022-07-12 DIAGNOSIS — M32.9 LUPUS (HCC): Primary | ICD-10-CM

## 2022-07-12 NOTE — TELEPHONE ENCOUNTER
Patient called asking for a refill on hydroxychloroquine. States it was from her previous PCP. Asked patient to confirm dosage and she stated she was not sure. States she takes half a pill but was not sure what was prescribed. States she will call back tomorrow when she is home to look at her pill bottle.

## 2022-07-14 RX ORDER — HYDROXYCHLOROQUINE SULFATE 200 MG/1
300 TABLET, FILM COATED ORAL DAILY
Qty: 45 TABLET | Refills: 1 | Status: SHIPPED | OUTPATIENT
Start: 2022-07-14 | End: 2022-09-23

## 2022-07-14 NOTE — TELEPHONE ENCOUNTER
Called patient to verify dose. Patient states the bottle states 200mg tablet-take 1.5 tablets daily.   Pharmacy is [facility]

## 2022-09-23 DIAGNOSIS — M32.9 LUPUS (HCC): ICD-10-CM

## 2022-09-23 RX ORDER — HYDROXYCHLOROQUINE SULFATE 200 MG/1
TABLET, FILM COATED ORAL
Qty: 45 TABLET | Refills: 1 | Status: SHIPPED | OUTPATIENT
Start: 2022-09-23

## 2022-09-23 NOTE — TELEPHONE ENCOUNTER
Last visit: 05/11/2022  Last Med refill: 08/28/2022  Does patient have enough medication for 72 hours: No:     Next Visit Date:  Future Appointments   Date Time Provider Enedina Moyer   10/24/2022  8:45 AM NATHALY Cardona  [address] [facility]   Topic Date Due    COVID-19 Vaccine (1) Never done    Varicella vaccine (1 of 2 - 2-dose childhood series) Never done    Pneumococcal 0-64 years Vaccine (1 - PCV) Never done    HIV screen  Never done    Hepatitis C screen  Never done    DTaP/Tdap/Td vaccine (1 - Tdap) Never done    Cervical cancer screen  Never done    Flu vaccine (1) Never done    Depression Screen  03/31/2023    Lipids  12/14/2026    Hepatitis A vaccine  Aged Out    Hepatitis B vaccine  Aged Out    Hib vaccine  Aged Out    Meningococcal (ACWY) vaccine  Aged Out       No results found for: LABA1C          ( goal A1C is < 7)   No results found for: LABMICR  No results found for: LDLCHOLESTEROL, LDLCALC    (goal LDL is <100)   AST (U/L)   Date Value   05/11/2022 25     ALT (U/L)   Date Value   05/11/2022 36 (H)     BUN (mg/dL)   Date Value   05/11/2022 17     BP Readings from Last 3 Encounters:   05/11/22 108/68   04/27/22 102/68   03/31/22 112/76          (goal 120/80)    All Future Testing planned in [facility]              Patient Active Problem List:     Constipation     Chronic migraine without aura with status migrainosus, not intractable     Acute non-recurrent frontal sinusitis     BILLY positive     Antiphospholipid syndrome (HCC)     Cervical herniated disc     Cervical spondylosis with radiculopathy     Chronic fatigue     Dyspareunia in female     Gastroesophageal reflux disease     Left tennis elbow     Moderate persistent asthma with acute exacerbation     Other hyperlipidemia     Pain in right hand     SOB (shortness of breath)     Vaginal dryness     Lupus ([address]

## 2022-10-21 ENCOUNTER — TELEPHONE (OUTPATIENT)
Dept: FAMILY MEDICINE CLINIC | Age: 42
End: 2022-10-21